# Patient Record
Sex: MALE | Race: WHITE | NOT HISPANIC OR LATINO | Employment: OTHER | URBAN - METROPOLITAN AREA
[De-identification: names, ages, dates, MRNs, and addresses within clinical notes are randomized per-mention and may not be internally consistent; named-entity substitution may affect disease eponyms.]

---

## 2019-02-21 ENCOUNTER — OFFICE VISIT (OUTPATIENT)
Dept: INTERNAL MEDICINE | Facility: CLINIC | Age: 39
End: 2019-02-21
Payer: COMMERCIAL

## 2019-02-21 VITALS
WEIGHT: 204.38 LBS | HEIGHT: 74 IN | HEART RATE: 82 BPM | DIASTOLIC BLOOD PRESSURE: 82 MMHG | BODY MASS INDEX: 26.23 KG/M2 | SYSTOLIC BLOOD PRESSURE: 122 MMHG

## 2019-02-21 DIAGNOSIS — Z76.89 ESTABLISHING CARE WITH NEW DOCTOR, ENCOUNTER FOR: Primary | ICD-10-CM

## 2019-02-21 DIAGNOSIS — M54.32 LEFT SIDED SCIATICA: ICD-10-CM

## 2019-02-21 DIAGNOSIS — N52.2 DRUG-INDUCED ERECTILE DYSFUNCTION: ICD-10-CM

## 2019-02-21 DIAGNOSIS — G43.009 MIGRAINE WITHOUT AURA AND WITHOUT STATUS MIGRAINOSUS, NOT INTRACTABLE: ICD-10-CM

## 2019-02-21 DIAGNOSIS — F41.1 GENERALIZED ANXIETY DISORDER: ICD-10-CM

## 2019-02-21 PROCEDURE — 99999 PR PBB SHADOW E&M-NEW PATIENT-LVL IV: ICD-10-PCS | Mod: PBBFAC,,, | Performed by: INTERNAL MEDICINE

## 2019-02-21 PROCEDURE — 99999 PR PBB SHADOW E&M-NEW PATIENT-LVL IV: CPT | Mod: PBBFAC,,, | Performed by: INTERNAL MEDICINE

## 2019-02-21 PROCEDURE — 99204 PR OFFICE/OUTPT VISIT, NEW, LEVL IV, 45-59 MIN: ICD-10-PCS | Mod: S$GLB,,, | Performed by: INTERNAL MEDICINE

## 2019-02-21 PROCEDURE — 99204 OFFICE O/P NEW MOD 45 MIN: CPT | Mod: S$GLB,,, | Performed by: INTERNAL MEDICINE

## 2019-02-21 RX ORDER — TADALAFIL 20 MG/1
20 TABLET ORAL DAILY
Qty: 10 TABLET | Refills: 11 | Status: SHIPPED | OUTPATIENT
Start: 2019-02-21

## 2019-02-21 RX ORDER — ORPHENADRINE CITRATE 100 MG/1
100 TABLET, EXTENDED RELEASE ORAL 2 TIMES DAILY PRN
Qty: 60 TABLET | Refills: 3 | Status: SHIPPED | OUTPATIENT
Start: 2019-02-21 | End: 2019-03-03

## 2019-02-21 RX ORDER — DICLOFENAC SODIUM 50 MG/1
50 TABLET, DELAYED RELEASE ORAL DAILY
COMMUNITY
End: 2019-02-21

## 2019-02-21 RX ORDER — BUPROPION HYDROCHLORIDE 150 MG/1
150 TABLET ORAL DAILY
Qty: 30 TABLET | Refills: 11 | Status: SHIPPED | OUTPATIENT
Start: 2019-02-21 | End: 2020-02-21

## 2019-02-21 RX ORDER — TOPIRAMATE 25 MG/1
25 TABLET ORAL DAILY
COMMUNITY
End: 2019-02-21

## 2019-02-21 RX ORDER — SUMATRIPTAN SUCCINATE 100 MG/1
100 TABLET ORAL EVERY 12 HOURS PRN
Qty: 20 TABLET | Refills: 11 | Status: SHIPPED | OUTPATIENT
Start: 2019-02-21

## 2019-02-21 RX ORDER — MELOXICAM 15 MG/1
15 TABLET ORAL DAILY
Qty: 30 TABLET | Refills: 0 | Status: SHIPPED | OUTPATIENT
Start: 2019-02-21

## 2019-02-21 NOTE — PROGRESS NOTES
INTERNAL MEDICINE CLINIC    Initial Visit to Establish Care    PRESENTING HISTORY     Previous PCP:  None.  Chief Complaint/Reason for Visit:     Chief Complaint   Patient presents with    Establish Care     History of Present Illness & ROS : Mr. Neftali Gamble is a 38 y.o. male.      Review of Systems   Constitutional: Negative for chills, fever and weight loss.   HENT: Positive for sinus pain.    Eyes: Negative for blurred vision.   Respiratory: Negative for cough and shortness of breath.    Cardiovascular: Negative for chest pain and leg swelling.   Gastrointestinal: Negative for abdominal pain, heartburn, nausea and vomiting.   Genitourinary: Negative for dysuria.   Musculoskeletal: Positive for back pain. Negative for neck pain.   Skin: Negative for rash.   Neurological: Negative for dizziness and headaches.   Endo/Heme/Allergies: Negative for polydipsia.   Psychiatric/Behavioral: Positive for depression. Negative for substance abuse and suicidal ideas. The patient is nervous/anxious.      Sexual dysfunction: erection only 10 minutes. It upsets him.  Anxious due to child with ADHD.    PAST HISTORY:     Past Medical History:   Diagnosis Date    History of cocaine use     Last use 2018.    Left sided sciatica 2/21/2019    Migraine without aura and without status migrainosus, not intractable 2/21/2019       History reviewed. No pertinent surgical history.    Family History   Problem Relation Age of Onset    Other Mother         Fell and had bleeding in brain    Diabetes Father     Colon cancer Father        Social History     Socioeconomic History    Marital status:      Spouse name: Prachi    Number of children: 1    Years of education: None    Highest education level: None   Social Needs    Financial resource strain: None    Food insecurity - worry: None    Food insecurity - inability: None    Transportation needs - medical: None    Transportation needs - non-medical: None   Occupational  History    Occupation: self employed   Tobacco Use    Smoking status: Former Smoker     Packs/day: 1.00     Years: 15.00     Pack years: 15.00    Smokeless tobacco: Never Used   Substance and Sexual Activity    Alcohol use: Yes     Alcohol/week: 8.4 oz     Types: 14 Glasses of wine per week    Drug use: No     Comment: Was on cocaine in the past    Sexual activity: Yes     Partners: Female   Other Topics Concern    None   Social History Narrative    Self-Employee:  [employer]      but with problem - trying to reconcile.    Wife: Prachi Gamble    1 child (5 year Carlos - ADHD as of 2019)       MEDICATIONS & ALLERGIES:     Current Outpatient Medications on File Prior to Visit   Medication Sig Dispense Refill    diclofenac (VOLTAREN) 50 MG EC tablet Take 50 mg by mouth once daily.      topiramate (TOPAMAX) 25 MG tablet Take 25 mg by mouth once daily.       Review of patient's allergies indicates:   Allergen Reactions    Nsaids (non-steroidal anti-inflammatory drug)      Pt states he does not know the name of the NSAID that caused him to break out into a rash.  PT states he has no problems taking OTC NSAIDS       Medications Reconciliation:   I have reconciled the patient's home medications with the patient/family. I have updated all changes.  Refer to After-Visit Medication List.    OBJECTIVE:     Vital Signs:  Vitals:    02/21/19 1729   BP: 122/82   Pulse: 82     Wt Readings from Last 1 Encounters:   02/21/19 1729 92.7 kg (204 lb 5.9 oz)     Body mass index is 26.24 kg/m².     Physical Exam:  General: Well developed, well nourished. No distress.  HEENT: Head is normocephalic, atraumatic; ears are normal.    Eyes: Clear conjunctiva.  Neck: Supple, symmetrical neck; trachea midline.  Lungs: Clear to auscultation bilaterally and normal respiratory effort.  Cardiovascular: Heart with regular rate and rhythm.    Extremities: No LE edema.    Abdomen: Abdomen is soft, non-tender non-distended with  normal bowel sounds.  Musculoskeletal: Normal gait.   Genital:  Normal penis. . No rash.  Scrotum and epididymis normal. No inguinal hernia.  No inguinal nodes. No rash found.  Rectal: Deferred.  Lymph Nodes: No cervical, supraclavicular or axillary adenopathy.  Psychiatric: Normal affect. Alert.      ASSESSMENT & PLAN:   New patient who has not seen Primary Care Provider at [facility] for the past 3 years.  Patient is new to me. Medical, surgical, social, medication and allergy histories were obtained during this visit.    Establish Care with New doctor  - Reviewed and updated past and current medical problems.  Discussed treatment of current medical problems    Generalized anxiety disorder  - Will try:  -     buPROPion (WELLBUTRIN XL) 150 MG TB24 tablet; Take 1 tablet (150 mg total) by mouth once daily.  Dispense: 30 tablet; Refill: 11    Left sided sciatica  -     Ambulatory referral to Pain Clinic  -     orphenadrine (NORFLEX) 100 mg tablet; Take 1 tablet (100 mg total) by mouth 2 (two) times daily as needed for Muscle spasms.  Dispense: 60 tablet; Refill: 3  -     meloxicam (MOBIC) 15 MG tablet; Take 1 tablet (15 mg total) by mouth once daily. Take with food.  Dispense: 30 tablet; Refill: 0    Migraine without aura and without status migrainosus, not intractable  -     sumatriptan (IMITREX) 100 MG tablet; Take 1 tablet (100 mg total) by mouth every 12 (twelve) hours as needed for Migraine. No more than twice in a 24 hour period  Dispense: 20 tablet; Refill: 11    Drug-induced erectile dysfunction  -     tadalafil (CIALIS) 20 MG Tab; Take 1 tablet (20 mg total) by mouth once daily.  Dispense: 10 tablet; Refill: 11    Preventive Health Maintenance:  Tdap, Flu Rx given to the patient.    Return to Clinic for Follow Up with me:  1 year.    Scheduled Follow-up :  Future Appointments   Date Time Provider Department Center   3/6/2019  9:00 AM Mika Fortune MD [facility]       After Visit Medication List  :     Medication List           Accurate as of 2/21/19  6:13 PM. If you have any questions, ask your nurse or doctor.               START taking these medications    buPROPion 150 MG TB24 tablet  Commonly known as:  WELLBUTRIN XL  Take 1 tablet (150 mg total) by mouth once daily.  Started by:  Fernando Snowden MD     meloxicam 15 MG tablet  Commonly known as:  MOBIC  Take 1 tablet (15 mg total) by mouth once daily. Take with food.  Started by:  Fernando Snowden MD     orphenadrine 100 mg tablet  Commonly known as:  NORFLEX  Take 1 tablet (100 mg total) by mouth 2 (two) times daily as needed for Muscle spasms.  Started by:  Fernando Snowden MD     sumatriptan 100 MG tablet  Commonly known as:  IMITREX  Take 1 tablet (100 mg total) by mouth every 12 (twelve) hours as needed for Migraine. No more than twice in a 24 hour period  Started by:  Fernando Snowden MD     tadalafil 20 MG Tab  Commonly known as:  CIALIS  Take 1 tablet (20 mg total) by mouth once daily.  Started by:  Fernando Snowden MD        STOP taking these medications    diazePAM 5 MG tablet  Commonly known as:  VALIUM  Stopped by:  Fernando Snowden MD     diclofenac 50 MG EC tablet  Commonly known as:  VOLTAREN  Stopped by:  Fernando Snowden MD     topiramate 25 MG tablet  Commonly known as:  TOPAMAX  Stopped by:  Fernando Snowden MD           Where to Get Your Medications      These medications were sent to [facility] [id number] [place] [address] [address]  AT [facility] [address]  [address] [place] [address]    Hours:  24-hours Phone:  [phone]   · buPROPion 150 MG TB24 tablet  · meloxicam 15 MG tablet  · orphenadrine 100 mg tablet  · sumatriptan 100 MG tablet  · tadalafil 20 MG Tab         Signing Physician:  Fernando Snowden MD

## 2019-02-25 ENCOUNTER — PATIENT MESSAGE (OUTPATIENT)
Dept: INTERNAL MEDICINE | Facility: CLINIC | Age: 39
End: 2019-02-25

## 2019-03-18 ENCOUNTER — OFFICE VISIT (OUTPATIENT)
Dept: PAIN MEDICINE | Facility: CLINIC | Age: 39
End: 2019-03-18
Payer: COMMERCIAL

## 2019-03-18 ENCOUNTER — PATIENT MESSAGE (OUTPATIENT)
Dept: PAIN MEDICINE | Facility: CLINIC | Age: 39
End: 2019-03-18

## 2019-03-18 VITALS
HEIGHT: 74 IN | BODY MASS INDEX: 25.75 KG/M2 | RESPIRATION RATE: 18 BRPM | WEIGHT: 200.63 LBS | TEMPERATURE: 98 F | HEART RATE: 62 BPM | SYSTOLIC BLOOD PRESSURE: 126 MMHG | DIASTOLIC BLOOD PRESSURE: 82 MMHG

## 2019-03-18 DIAGNOSIS — M54.42 CHRONIC LOW BACK PAIN WITH LEFT-SIDED SCIATICA, UNSPECIFIED BACK PAIN LATERALITY: ICD-10-CM

## 2019-03-18 DIAGNOSIS — M47.816 LUMBAR SPONDYLOSIS: ICD-10-CM

## 2019-03-18 DIAGNOSIS — M54.16 LUMBAR RADICULOPATHY: Primary | ICD-10-CM

## 2019-03-18 DIAGNOSIS — G89.29 CHRONIC LOW BACK PAIN WITH LEFT-SIDED SCIATICA, UNSPECIFIED BACK PAIN LATERALITY: ICD-10-CM

## 2019-03-18 PROCEDURE — 99204 OFFICE O/P NEW MOD 45 MIN: CPT | Mod: S$GLB,,, | Performed by: ANESTHESIOLOGY

## 2019-03-18 PROCEDURE — 99999 PR PBB SHADOW E&M-EST. PATIENT-LVL V: CPT | Mod: PBBFAC,,, | Performed by: ANESTHESIOLOGY

## 2019-03-18 PROCEDURE — 99204 PR OFFICE/OUTPT VISIT, NEW, LEVL IV, 45-59 MIN: ICD-10-PCS | Mod: S$GLB,,, | Performed by: ANESTHESIOLOGY

## 2019-03-18 PROCEDURE — 99999 PR PBB SHADOW E&M-EST. PATIENT-LVL V: ICD-10-PCS | Mod: PBBFAC,,, | Performed by: ANESTHESIOLOGY

## 2019-03-18 RX ORDER — GABAPENTIN 300 MG/1
300 CAPSULE ORAL 3 TIMES DAILY
Qty: 90 CAPSULE | Refills: 3 | Status: SHIPPED | OUTPATIENT
Start: 2019-03-18 | End: 2019-03-18 | Stop reason: SDUPTHER

## 2019-03-18 RX ORDER — GABAPENTIN 300 MG/1
300 CAPSULE ORAL 3 TIMES DAILY
Qty: 90 CAPSULE | Refills: 3 | Status: SHIPPED | OUTPATIENT
Start: 2019-03-18 | End: 2020-03-17

## 2019-03-18 NOTE — PROGRESS NOTES
Chronic Pain - New Consult    Referring Physician: Fernando Snowden MD    Chief Complaint:   Chief Complaint   Patient presents with    Low-back Pain     raidates to left leg pain         SUBJECTIVE:    Neftali Gamble presents to the clinic for the evaluation of lower back pain with left lower extremity radiation and tingling  The pain started 7 years ago following an injury and symptoms have been worsening.The pain is located in the lumbar area and radiates to the LLE.  The pain is described as sharp and is rated as 4/10. The pain is rated with a score of  5/10 on the BEST day and a score of 10/10 on the WORST day.  Symptoms interfere with daily activity and work. The pain is exacerbated by Standing and Walking.  The pain is mitigated by massage, medications and rest. He reports spending 0 hours per day reclining. The patient reports 7-8 hours of uninterrupted sleep per night.    Mr. Gamble presents with lumbar pain for roughly 7 years with 2 years of LLE radiation to the lateral and medial aspects of the thigh and leg to the bottom of the foot. He was evaluated and treated 7 years ago after a work related injury at [facility] where he underwent several lumbar injections providing minimal relief. He reports was also evaluated by a neurosurgeon at the time, the patient cannot recall the surgeon's name and mentioned that the patient may need surgery in the future. He reports was given a waist belt to wear for the lumbar pain which caused the onset for the LLE radicular symptoms and since then has not subsided. He takes NSAID's and muscle relaxer's , uses  TENS unit, and massage     Patient denies urinary incontinence, bowel incontinence and significant motor weakness.    Physical Therapy/Home Exercise: yes 2013 - temporarily , returned after completion of PT,  Continues to do exercise     Pain Disability Index Review:  Last 3 PDI Scores 3/18/2019   Pain Disability Index (PDI) 2       Pain  Medications:  Norfelx, mobic     - Adjuvant Medications: Mobic (Meloxicam)     report:  Not applicable    Pain Procedures: several lumbar injections at [facility]  Imaging:  X-Ray Lumbar Spine Ap And Lateral   Order: [id number]   Status:  Final result   Visible to patient:  Yes (Patient Portal) Next appt:  None   Details     Reading Physician Reading Date Result Priority   Imtiaz Best MD 3/12/2013       Narrative     No significant alignment abnormality.  Vertebral body heights are normally maintained, without compression deformity at any level.  No significant disc narrowing.  Vertebral endplates are well defined.  No osseous destructive process.  SI joints appear   unremarkable.      Impression      No significant abnormality.      Electronically signed by: Imtiaz Best MD  Date: 03/12/13  Time: 11:15                Past Medical History:   Diagnosis Date    Drug-induced erectile dysfunction 2/21/2019    Generalized anxiety disorder 2/21/2019    History of cocaine use     Last use 2018.    Left sided sciatica 2/21/2019    Migraine without aura and without status migrainosus, not intractable 2/21/2019     No past surgical history on file.  Social History     Socioeconomic History    Marital status:      Spouse name: Prachi    Number of children: 1    Years of education: Not on file    Highest education level: Not on file   Social Needs    Financial resource strain: Not on file    Food insecurity - worry: Not on file    Food insecurity - inability: Not on file    Transportation needs - medical: Not on file    Transportation needs - non-medical: Not on file   Occupational History    Occupation: self employed   Tobacco Use    Smoking status: Former Smoker     Packs/day: 1.00     Years: 15.00     Pack years: 15.00    Smokeless tobacco: Never Used   Substance and Sexual Activity    Alcohol use: Yes     Alcohol/week: 8.4 oz     Types: 14 Glasses of wine per week    Drug use: No      Comment: Was on cocaine in the past    Sexual activity: Yes     Partners: Female   Other Topics Concern    Not on file   Social History Narrative    Self-Employee:  [employer]      but with problem - trying to reconcile.    Wife: Prachi Gamble    1 child (5 year Carlos - ADHD as of 2019)     Family History   Problem Relation Age of Onset    Other Mother         Fell and had bleeding in brain    Diabetes Father     Colon cancer Father        Review of patient's allergies indicates:  No Known Allergies    Current Outpatient Medications   Medication Sig    buPROPion (WELLBUTRIN XL) 150 MG TB24 tablet Take 1 tablet (150 mg total) by mouth once daily.    gabapentin (NEURONTIN) 300 MG capsule Take 1 capsule (300 mg total) by mouth 3 (three) times daily. Take 1 pill at nightx 7 days, followed by 1 pill in morning and night for 7 days, followed by 1 pill morning afternoon and night    meloxicam (MOBIC) 15 MG tablet Take 1 tablet (15 mg total) by mouth once daily. Take with food.    sumatriptan (IMITREX) 100 MG tablet Take 1 tablet (100 mg total) by mouth every 12 (twelve) hours as needed for Migraine. No more than twice in a 24 hour period    tadalafil (CIALIS) 20 MG Tab Take 1 tablet (20 mg total) by mouth once daily.     No current facility-administered medications for this visit.        REVIEW OF SYSTEMS:    GENERAL:  No weight loss, malaise or fevers.  HEENT:  Negative for frequent or significant headaches.  NECK:  Negative for lumps, goiter, pain and significant neck swelling.  RESPIRATORY:  Negative for cough, wheezing or shortness of breath.  CARDIOVASCULAR:  Negative for chest pain, leg swelling or palpitations.  GI:  Negative for abdominal discomfort, blood in stools or black stools or change in bowel habits.  MUSCULOSKELETAL:  See HPI.  SKIN:  Negative for lesions, rash, and itching.  PSYCH:  Negative for sleep disturbance, mood disorder and recent psychosocial stressors. +  "PATRICK  HEMATOLOGY/LYMPHOLOGY:  Negative for prolonged bleeding, bruising easily or swollen nodes.  NEURO:   No history of headaches, syncope, paralysis, seizures or tremors.  All other reviewed and negative other than HPI.    OBJECTIVE:    /82   Pulse 62   Temp 98.4 °F (36.9 °C) (Oral)   Resp 18   Ht 6' 2" (1.88 m)   Wt 91 kg (200 lb 9.9 oz)   BMI 25.76 kg/m²     PHYSICAL EXAMINATION:    General appearance: Well appearing, in no acute distress, alert and oriented x3.  Psych:  Mood and affect appropriate.  Skin: Skin color, texture, turgor normal, no rashes or lesions, in both upper and lower body.  Head/face:  Normocephalic, atraumatic. No palpable lymph nodes.  Cor: RRR  Pulm: CTA  GI:  Soft and non-tender.  Back: Straight leg raising in the sitting and supine positions is negative to radicular pain. Limited range of motion of lumbar spine.  Tenderness to palpation over the left lumbar paraspinal muscles with positive facet loading on the left.  Extremities: Peripheral joint ROM is full and pain free without obvious instability or laxity in all four extremities. No deformities, edema, or skin discoloration. Good capillary refill.  Musculoskeletal: hip, sacroiliac and knee provocative maneuvers are negative. Bilateral  lower extremity strength is normal and symmetric.  No atrophy or tone abnormalities are noted.  Neuro: Bilateral  lower extremity coordination and muscle stretch reflexes are physiologic and symmetric.  Plantar response are downgoing. No loss of sensation is noted.  Gait: normal.    ASSESSMENT: 38 y.o. year old male with history of PATRICK pain, consistent with diagnosis below     1. Lumbar radiculopathy  X-Ray Lumbar Complete With Flex And Ext    MRI Lumbar Spine Without Contrast    Ambulatory consult to Physical Therapy    MRI Lumbar Spine Without Contrast    gabapentin (NEURONTIN) 300 MG capsule    DISCONTINUED: gabapentin (NEURONTIN) 300 MG capsule   2. Chronic low back pain with left-sided " sciatica, unspecified back pain laterality  X-Ray Lumbar Complete With Flex And Ext    MRI Lumbar Spine Without Contrast    Ambulatory consult to Physical Therapy    MRI Lumbar Spine Without Contrast    gabapentin (NEURONTIN) 300 MG capsule    DISCONTINUED: gabapentin (NEURONTIN) 300 MG capsule   3. Lumbar spondylosis  X-Ray Lumbar Complete With Flex And Ext    MRI Lumbar Spine Without Contrast    Ambulatory consult to Physical Therapy    MRI Lumbar Spine Without Contrast    gabapentin (NEURONTIN) 300 MG capsule    DISCONTINUED: gabapentin (NEURONTIN) 300 MG capsule         PLAN:     Previous imaging was reviewed and discussed with the patient today.     -Referral for PT for lumbar conditioning, exercises and strengthening for lumbar axial and LLE radicular pian      -Lumbar MRI and flex/ext xrays, he reports his last MRI was around 2011 following the work related injury and lumbar Xrays were in 2013     -Prescription for gabapentin 300mg TID escalating  the dose per week for 3 weeks to achieve 3 capsules three times a day, monitoring for possible side effects     - I have stressed the importance of physical activity and a home exercise plan to help with pain and improve health.    - Patient can continue with medications for now since they are providing benefits, using them appropriately, and without side effects.    - RTC 4 weeks, will follow up MRI/ xrays and progression of PT, we will also consider intervention after reviewing imaging     - Counseled patient regarding the importance of activity modification, constant sleeping habits and physical therapy.    He reports axial and facet loading pain on physical exam  with LLE radiculopathy and from his history, after reviewing his lumbar imaging, we may consider TFESI at the levels of neuralformainal narrowing and if there is facet arthropathy presents,we may consider lumbar MBB/RFA in the future. He will need to continue his PT home plan for core strengthening and  exercise to help with his lower back and radicular symptoms.     The above plan and management options were discussed at length with patient. Patient is in agreement with the above and verbalized understanding. It will be communicated with the referring physician via electronic record, fax, or mail.      I have reviewed and concur with the resident's history, physical, assessment, and plan.  I have personally interviewed and examined the patient at bedside.  See below addendum for my evaluation and additional findings.  38-year-old male with chronic lower back pain consistent with lumbar radiculopathy with radiation to left lower extremity.  On exam there is tenderness to palpation over the left lumbar paraspinal muscles positive facet loading on the left side.  We will refer him to physical therapy, order lumbar spine x-ray and MRI to further evaluate the etiology of his pain. We will start him on gabapentin 300 mg t.i.d.    Mika Fortune  03/18/2019

## 2019-03-18 NOTE — LETTER
March 18, 2019      Fernando Snowden MD  [address]  [facility] [address]           [facility] [address]  [address]  [place] [address]  Phone: [phone]  Fax: [phone]          Patient: Neftali Gamble   MR Number: [id number]   YOB: 1980   Date of Visit: 3/18/2019       Dear Dr. Fernando Snowden:    Thank you for referring Neftali Gamble to me for evaluation. Attached you will find relevant portions of my assessment and plan of care.    If you have questions, please do not hesitate to call me. I look forward to following Neftali Gamble along with you.    Sincerely,    Mika Fortune MD    Enclosure  CC:  No Recipients    If you would like to receive this communication electronically, please contact [email or web address] or [phone] to request more information on [employer] access.    For providers and/or their staff who would like to refer a patient to [facility], please contact us through our one-stop-shop provider referral line, [facility], at [phone].    If you feel you have received this communication in error or would no longer like to receive these types of communications, please e-mail [email or web address]

## 2019-03-19 ENCOUNTER — HOSPITAL ENCOUNTER (OUTPATIENT)
Dept: RADIOLOGY | Facility: OTHER | Age: 39
Discharge: HOME | End: 2019-03-19
Attending: STUDENT IN AN ORGANIZED HEALTH CARE EDUCATION/TRAINING PROGRAM
Payer: COMMERCIAL

## 2019-03-19 DIAGNOSIS — G89.29 CHRONIC LOW BACK PAIN WITH LEFT-SIDED SCIATICA, UNSPECIFIED BACK PAIN LATERALITY: ICD-10-CM

## 2019-03-19 DIAGNOSIS — M54.42 CHRONIC LOW BACK PAIN WITH LEFT-SIDED SCIATICA, UNSPECIFIED BACK PAIN LATERALITY: ICD-10-CM

## 2019-03-19 DIAGNOSIS — M47.816 LUMBAR SPONDYLOSIS: ICD-10-CM

## 2019-03-19 DIAGNOSIS — M54.16 LUMBAR RADICULOPATHY: ICD-10-CM

## 2019-03-19 PROCEDURE — 72110 X-RAY EXAM L-2 SPINE 4/>VWS: CPT | Mod: TC,FY

## 2019-03-19 PROCEDURE — 72110 XR LUMBAR SPINE 5 VIEW WITH FLEX AND EXT: ICD-10-PCS | Mod: 26,,, | Performed by: RADIOLOGY

## 2019-03-19 PROCEDURE — 72110 X-RAY EXAM L-2 SPINE 4/>VWS: CPT | Mod: 26,,, | Performed by: RADIOLOGY

## 2019-03-19 RX ORDER — GABAPENTIN 300 MG/1
300 CAPSULE ORAL 3 TIMES DAILY
Qty: 90 CAPSULE | Refills: 2 | Status: SHIPPED | OUTPATIENT
Start: 2019-03-19 | End: 2019-04-18

## 2019-12-15 ENCOUNTER — HOSPITAL ENCOUNTER (EMERGENCY)
Facility: HOSPITAL | Age: 39
Discharge: LEFT BEFORE DISCHARGE | End: 2019-12-15
Attending: EMERGENCY MEDICINE
Payer: COMMERCIAL

## 2019-12-15 VITALS
TEMPERATURE: 99 F | SYSTOLIC BLOOD PRESSURE: 136 MMHG | BODY MASS INDEX: 25.67 KG/M2 | RESPIRATION RATE: 20 BRPM | DIASTOLIC BLOOD PRESSURE: 76 MMHG | HEIGHT: 74 IN | WEIGHT: 200 LBS | HEART RATE: 92 BPM | OXYGEN SATURATION: 98 %

## 2019-12-15 DIAGNOSIS — K92.2 GI BLEED: ICD-10-CM

## 2019-12-15 LAB
ABO + RH BLD: NORMAL
ALBUMIN SERPL BCP-MCNC: 4.7 G/DL (ref 3.5–5.2)
ALP SERPL-CCNC: 92 U/L (ref 55–135)
ALT SERPL W/O P-5'-P-CCNC: 39 U/L (ref 10–44)
ANION GAP SERPL CALC-SCNC: 12 MMOL/L (ref 8–16)
AST SERPL-CCNC: 22 U/L (ref 10–40)
BASOPHILS # BLD AUTO: 0.04 K/UL (ref 0–0.2)
BASOPHILS NFR BLD: 0.3 % (ref 0–1.9)
BILIRUB SERPL-MCNC: 1.3 MG/DL (ref 0.1–1)
BLD GP AB SCN CELLS X3 SERPL QL: NORMAL
BUN SERPL-MCNC: 15 MG/DL (ref 6–20)
CALCIUM SERPL-MCNC: 10.4 MG/DL (ref 8.7–10.5)
CHLORIDE SERPL-SCNC: 100 MMOL/L (ref 95–110)
CO2 SERPL-SCNC: 26 MMOL/L (ref 23–29)
CREAT SERPL-MCNC: 1 MG/DL (ref 0.5–1.4)
DIFFERENTIAL METHOD: ABNORMAL
EOSINOPHIL # BLD AUTO: 0 K/UL (ref 0–0.5)
EOSINOPHIL NFR BLD: 0.1 % (ref 0–8)
ERYTHROCYTE [DISTWIDTH] IN BLOOD BY AUTOMATED COUNT: 11.5 % (ref 11.5–14.5)
EST. GFR  (AFRICAN AMERICAN): >60 ML/MIN/1.73 M^2
EST. GFR  (NON AFRICAN AMERICAN): >60 ML/MIN/1.73 M^2
GLUCOSE SERPL-MCNC: 129 MG/DL (ref 70–110)
HCT VFR BLD AUTO: 47.8 % (ref 40–54)
HGB BLD-MCNC: 17.1 G/DL (ref 14–18)
IMM GRANULOCYTES # BLD AUTO: 0.05 K/UL (ref 0–0.04)
IMM GRANULOCYTES NFR BLD AUTO: 0.3 % (ref 0–0.5)
INR PPP: 1 (ref 0.8–1.2)
LIPASE SERPL-CCNC: 15 U/L (ref 4–60)
LYMPHOCYTES # BLD AUTO: 2 K/UL (ref 1–4.8)
LYMPHOCYTES NFR BLD: 13.6 % (ref 18–48)
MCH RBC QN AUTO: 31.2 PG (ref 27–31)
MCHC RBC AUTO-ENTMCNC: 35.8 G/DL (ref 32–36)
MCV RBC AUTO: 87 FL (ref 82–98)
MONOCYTES # BLD AUTO: 1.2 K/UL (ref 0.3–1)
MONOCYTES NFR BLD: 7.9 % (ref 4–15)
NEUTROPHILS # BLD AUTO: 11.3 K/UL (ref 1.8–7.7)
NEUTROPHILS NFR BLD: 77.8 % (ref 38–73)
NRBC BLD-RTO: 0 /100 WBC
PLATELET # BLD AUTO: 278 K/UL (ref 150–350)
PMV BLD AUTO: 9.5 FL (ref 9.2–12.9)
POTASSIUM SERPL-SCNC: 3.5 MMOL/L (ref 3.5–5.1)
PROT SERPL-MCNC: 8.6 G/DL (ref 6–8.4)
PROTHROMBIN TIME: 10.3 SEC (ref 9–12.5)
RBC # BLD AUTO: 5.48 M/UL (ref 4.6–6.2)
SODIUM SERPL-SCNC: 138 MMOL/L (ref 136–145)
WBC # BLD AUTO: 14.53 K/UL (ref 3.9–12.7)

## 2019-12-15 PROCEDURE — 96374 THER/PROPH/DIAG INJ IV PUSH: CPT

## 2019-12-15 PROCEDURE — 99285 PR EMERGENCY DEPT VISIT,LEVEL V: ICD-10-PCS | Mod: ,,, | Performed by: EMERGENCY MEDICINE

## 2019-12-15 PROCEDURE — 85610 PROTHROMBIN TIME: CPT

## 2019-12-15 PROCEDURE — 93010 ELECTROCARDIOGRAM REPORT: CPT | Mod: ,,, | Performed by: INTERNAL MEDICINE

## 2019-12-15 PROCEDURE — 83690 ASSAY OF LIPASE: CPT

## 2019-12-15 PROCEDURE — 93005 ELECTROCARDIOGRAM TRACING: CPT

## 2019-12-15 PROCEDURE — 99284 EMERGENCY DEPT VISIT MOD MDM: CPT | Mod: 25

## 2019-12-15 PROCEDURE — 93010 EKG 12-LEAD: ICD-10-PCS | Mod: ,,, | Performed by: INTERNAL MEDICINE

## 2019-12-15 PROCEDURE — 63600175 PHARM REV CODE 636 W HCPCS: Performed by: EMERGENCY MEDICINE

## 2019-12-15 PROCEDURE — 80053 COMPREHEN METABOLIC PANEL: CPT

## 2019-12-15 PROCEDURE — 96375 TX/PRO/DX INJ NEW DRUG ADDON: CPT

## 2019-12-15 PROCEDURE — 85025 COMPLETE CBC W/AUTO DIFF WBC: CPT

## 2019-12-15 PROCEDURE — 99285 EMERGENCY DEPT VISIT HI MDM: CPT | Mod: ,,, | Performed by: EMERGENCY MEDICINE

## 2019-12-15 PROCEDURE — C9113 INJ PANTOPRAZOLE SODIUM, VIA: HCPCS | Performed by: EMERGENCY MEDICINE

## 2019-12-15 PROCEDURE — 86901 BLOOD TYPING SEROLOGIC RH(D): CPT

## 2019-12-15 RX ORDER — PANTOPRAZOLE SODIUM 40 MG/10ML
80 INJECTION, POWDER, LYOPHILIZED, FOR SOLUTION INTRAVENOUS
Status: COMPLETED | OUTPATIENT
Start: 2019-12-15 | End: 2019-12-15

## 2019-12-15 RX ORDER — PANTOPRAZOLE SODIUM 20 MG/1
20 TABLET, DELAYED RELEASE ORAL DAILY
Qty: 30 TABLET | Refills: 0 | Status: SHIPPED | OUTPATIENT
Start: 2019-12-15 | End: 2020-12-14

## 2019-12-15 RX ORDER — ONDANSETRON 2 MG/ML
4 INJECTION INTRAMUSCULAR; INTRAVENOUS
Status: COMPLETED | OUTPATIENT
Start: 2019-12-15 | End: 2019-12-15

## 2019-12-15 RX ORDER — ONDANSETRON 8 MG/1
8 TABLET, ORALLY DISINTEGRATING ORAL EVERY 8 HOURS PRN
Qty: 6 TABLET | Refills: 0 | Status: SHIPPED | OUTPATIENT
Start: 2019-12-15

## 2019-12-15 RX ADMIN — SODIUM CHLORIDE 1000 ML: 0.9 INJECTION, SOLUTION INTRAVENOUS at 08:12

## 2019-12-15 RX ADMIN — ONDANSETRON 4 MG: 2 INJECTION INTRAMUSCULAR; INTRAVENOUS at 08:12

## 2019-12-15 RX ADMIN — PANTOPRAZOLE SODIUM 80 MG: 40 INJECTION, POWDER, FOR SOLUTION INTRAVENOUS at 08:12

## 2019-12-16 NOTE — ED PROVIDER NOTES
Source of History:  Patient    Chief complaint:  Hematemesis (Pt c/o dark red blood in emesis since yesterday. He reports multiple episodes since then. Denies blood in stool. )      HPI:  Neftali Gamble is a 39 y.o. male presenting with multiple episodes of vomiting since yesterday that finish with some blood.  The vomit is dark brown initially and then ends with blood.  He has a need anything last couple of days.  Friday night he when out and had a fair amount to drink; his wife is with him and does not have any similar symptoms.  He had 1 stool yesterday that looked normal. No history of gastritis, reflux, epigastric pain, bleeding disorders or any other abdominal problems.    ROS: As per HPI and below:  General: No fever.  No chills.  Eyes: No visual changes.  Head: No headache.    Integument: No rashes or lesions.  Chest: No shortness of breath.  Cardiovascular: No chest pain.  Abdomen: No abdominal pain.  Notes nausea and vomiting.  Urinary: No abnormal urination.  Neurologic: No focal weakness.  No numbness.  Hematologic: No easy bruising.  Endocrine: No excessive thirst or urination.      Review of patient's allergies indicates:  No Known Allergies    No current facility-administered medications on file prior to encounter.      Current Outpatient Medications on File Prior to Encounter   Medication Sig Dispense Refill    buPROPion (WELLBUTRIN XL) 150 MG TB24 tablet Take 1 tablet (150 mg total) by mouth once daily. 30 tablet 11    gabapentin (NEURONTIN) 300 MG capsule Take 1 capsule (300 mg total) by mouth 3 (three) times daily. Take 1 pill at nightx 7 days, followed by 1 pill in morning and night for 7 days, followed by 1 pill morning afternoon and night 90 capsule 3    meloxicam (MOBIC) 15 MG tablet Take 1 tablet (15 mg total) by mouth once daily. Take with food. 30 tablet 0    sumatriptan (IMITREX) 100 MG tablet Take 1 tablet (100 mg total) by mouth every 12 (twelve) hours as needed for Migraine. No more  than twice in a 24 hour period 20 tablet 11    tadalafil (CIALIS) 20 MG Tab Take 1 tablet (20 mg total) by mouth once daily. 10 tablet 11       PMH:  As per HPI and below:  Past Medical History:   Diagnosis Date    Drug-induced erectile dysfunction 2/21/2019    Generalized anxiety disorder 2/21/2019    History of cocaine use     Last use 2018.    Left sided sciatica 2/21/2019    Migraine without aura and without status migrainosus, not intractable 2/21/2019     History reviewed. No pertinent surgical history.    Social History     Socioeconomic History    Marital status:      Spouse name: Prachi    Number of children: 1    Years of education: Not on file    Highest education level: Not on file   Occupational History    Occupation: self employed   Social Needs    Financial resource strain: Not on file    Food insecurity:     Worry: Not on file     Inability: Not on file    Transportation needs:     Medical: Not on file     Non-medical: Not on file   Tobacco Use    Smoking status: Former Smoker     Packs/day: 1.00     Years: 15.00     Pack years: 15.00    Smokeless tobacco: Never Used   Substance and Sexual Activity    Alcohol use: Yes     Alcohol/week: 14.0 standard drinks     Types: 14 Glasses of wine per week    Drug use: No     Comment: Was on cocaine in the past    Sexual activity: Yes     Partners: Female   Lifestyle    Physical activity:     Days per week: Not on file     Minutes per session: Not on file    Stress: Not on file   Relationships    Social connections:     Talks on phone: Not on file     Gets together: Not on file     Attends Rastafarian service: Not on file     Active member of club or organization: Not on file     Attends meetings of clubs or organizations: Not on file     Relationship status: Not on file   Other Topics Concern    Not on file   Social History Narrative    Self-Employee:  [employer]      but with problem - trying to reconcile.    Wife:  "Prachi Gamble    1 child (5 year Carlos - ADHD as of 2019)       Family History   Problem Relation Age of Onset    Other Mother         Fell and had bleeding in brain    Diabetes Father     Colon cancer Father        Physical Exam:    Vitals:    12/15/19 1906 12/15/19 1949 12/15/19 1950 12/15/19 2100   BP: (!) 143/81 (!) 151/85 (!) 142/80 136/76   Pulse: 105 110 104 92   Resp: 16  20 20   Temp: 99.2 °F (37.3 °C)      TempSrc: Oral      SpO2: 97% 97% 98% 98%   Weight: 90.7 kg (200 lb)      Height: 6' 2" (1.88 m)        Appearance: No acute distress.  Skin: No rashes seen.  Good turgor.  No abrasions.  No ecchymoses.  Eyes: No conjunctival injection.  ENT: Oropharynx clear.    Chest: Clear to auscultation bilaterally.  Good air movement.  No wheezes.  No rhonchi.  Cardiovascular: Regular rate and rhythm.  No murmurs. No gallops. No rubs.  Abdomen: Soft.  Not distended.  Nontender.  No guarding.  No rebound.  Musculoskeletal: Good range of motion all joints.  No deformities.  Neck supple.  No meningismus.  Neurologic: Motor intact.  Sensation intact.  Cerebellar intact.  Cranial nerves intact.  Mental Status:  Alert and oriented x 3.  Appropriate, conversant.      Initial Impression:  Hematemesis.  When he describes sounds like coffee-ground emesis followed by blood.  There are any significant amounts of clots.  He also just could have some mild gastritis following heavy night of drinking Friday night.  Going to give him fluids and nausea medicine and check his blood counts.  Plan to discuss with GI.    Laboratory Studies:  Labs Reviewed   CBC W/ AUTO DIFFERENTIAL - Abnormal; Notable for the following components:       Result Value    WBC 14.53 (*)     Mean Corpuscular Hemoglobin 31.2 (*)     Gran # (ANC) 11.3 (*)     Immature Grans (Abs) 0.05 (*)     Mono # 1.2 (*)     Gran% 77.8 (*)     Lymph% 13.6 (*)     All other components within normal limits   COMPREHENSIVE METABOLIC PANEL - Abnormal; Notable for the following " components:    Glucose 129 (*)     Total Protein 8.6 (*)     Total Bilirubin 1.3 (*)     All other components within normal limits   PROTIME-INR   LIPASE   TYPE & SCREEN       EKG (independently interpreted by me): EKG shows normal sinus rhythm and no acute ischemia, rate of 80.      Chart reviewed.     Imaging Results    None         Medications Given:  Medications   sodium chloride 0.9% bolus 1,000 mL (1,000 mLs Intravenous New Bag 12/15/19 2010)   pantoprazole injection 80 mg (80 mg Intravenous Given 12/15/19 2015)   ondansetron injection 4 mg (4 mg Intravenous Given 12/15/19 2010)       Discussed with:  Gastroenterology.  Given his benign labs, they want us to recheck his CBC in a few hours and discharge if no significant drop.    ED Course:  ED Course as of Dec 16 1515   Sun Dec 15, 2019   2035 Lipase: 15 [DC]   2036 Hemoglobin: 17.1 [DC]   2037 Creatinine: 1.0 [DC]   2037 Coumadin Monitoring INR: 1.0 [DC]   2037 Platelets: 278 [DC]      ED Course User Index  [DC] Fernando Deleon MD           MDM:    39 y.o. male with vomiting with a little bit of blood, normal labs, stable vitals.  Unlikely to be serious upper GI bleed.  Will urgent referral to GI.  Will check second CBC after observing for a few hours.  Turned over to Dr. Martin pending results.      Diagnostic Impression:    1. GI bleed               Fernando Deleon MD  12/16/19 1516

## 2019-12-16 NOTE — ED NOTES
Pt reports emesis that began yesterday that contains bright red blood, HA, and substernal chest pain. Pt reports at least 10 episodes of emesis. Pt denies SOB, dizziness, changes in vision, changes in urination/BM, abdominal pain. Pt denies taking medication today.

## 2019-12-16 NOTE — ED NOTES
Pt ststed he wanted to go home AMA. Pt stated he had is son with him and wanted to get him back home and  to school. Pt stated he was feeling better and was able to drink liquids. Joy Martin MD notified and AMA form was signed.

## 2020-10-05 ENCOUNTER — PATIENT MESSAGE (OUTPATIENT)
Dept: ADMINISTRATIVE | Facility: HOSPITAL | Age: 40
End: 2020-10-05

## 2021-01-04 ENCOUNTER — PATIENT MESSAGE (OUTPATIENT)
Dept: ADMINISTRATIVE | Facility: HOSPITAL | Age: 41
End: 2021-01-04

## 2021-04-05 ENCOUNTER — PATIENT MESSAGE (OUTPATIENT)
Dept: ADMINISTRATIVE | Facility: HOSPITAL | Age: 41
End: 2021-04-05

## 2021-04-16 ENCOUNTER — PATIENT MESSAGE (OUTPATIENT)
Dept: RESEARCH | Facility: HOSPITAL | Age: 41
End: 2021-04-16

## 2021-07-07 ENCOUNTER — PATIENT MESSAGE (OUTPATIENT)
Dept: ADMINISTRATIVE | Facility: HOSPITAL | Age: 41
End: 2021-07-07

## 2021-10-04 ENCOUNTER — PATIENT MESSAGE (OUTPATIENT)
Dept: ADMINISTRATIVE | Facility: HOSPITAL | Age: 41
End: 2021-10-04

## 2022-01-26 ENCOUNTER — PATIENT MESSAGE (OUTPATIENT)
Dept: ADMINISTRATIVE | Facility: HOSPITAL | Age: 42
End: 2022-01-26
Payer: COMMERCIAL

## 2022-05-23 ENCOUNTER — PATIENT MESSAGE (OUTPATIENT)
Dept: DERMATOLOGY | Facility: CLINIC | Age: 42
End: 2022-05-23
Payer: COMMERCIAL

## 2022-05-26 ENCOUNTER — OFFICE VISIT (OUTPATIENT)
Dept: DERMATOLOGY | Facility: CLINIC | Age: 42
End: 2022-05-26
Payer: COMMERCIAL

## 2022-05-26 DIAGNOSIS — L72.0 EIC (EPIDERMAL INCLUSION CYST): Primary | ICD-10-CM

## 2022-05-26 PROCEDURE — 99203 OFFICE O/P NEW LOW 30 MIN: CPT | Mod: S$GLB,,, | Performed by: STUDENT IN AN ORGANIZED HEALTH CARE EDUCATION/TRAINING PROGRAM

## 2022-05-26 PROCEDURE — 99999 PR PBB SHADOW E&M-EST. PATIENT-LVL III: CPT | Mod: PBBFAC,,, | Performed by: STUDENT IN AN ORGANIZED HEALTH CARE EDUCATION/TRAINING PROGRAM

## 2022-05-26 PROCEDURE — 99999 PR PBB SHADOW E&M-EST. PATIENT-LVL III: ICD-10-PCS | Mod: PBBFAC,,, | Performed by: STUDENT IN AN ORGANIZED HEALTH CARE EDUCATION/TRAINING PROGRAM

## 2022-05-26 PROCEDURE — 1160F RVW MEDS BY RX/DR IN RCRD: CPT | Mod: CPTII,S$GLB,, | Performed by: STUDENT IN AN ORGANIZED HEALTH CARE EDUCATION/TRAINING PROGRAM

## 2022-05-26 PROCEDURE — 1160F PR REVIEW ALL MEDS BY PRESCRIBER/CLIN PHARMACIST DOCUMENTED: ICD-10-PCS | Mod: CPTII,S$GLB,, | Performed by: STUDENT IN AN ORGANIZED HEALTH CARE EDUCATION/TRAINING PROGRAM

## 2022-05-26 PROCEDURE — 1159F PR MEDICATION LIST DOCUMENTED IN MEDICAL RECORD: ICD-10-PCS | Mod: CPTII,S$GLB,, | Performed by: STUDENT IN AN ORGANIZED HEALTH CARE EDUCATION/TRAINING PROGRAM

## 2022-05-26 PROCEDURE — 99203 PR OFFICE/OUTPT VISIT, NEW, LEVL III, 30-44 MIN: ICD-10-PCS | Mod: S$GLB,,, | Performed by: STUDENT IN AN ORGANIZED HEALTH CARE EDUCATION/TRAINING PROGRAM

## 2022-05-26 PROCEDURE — 1159F MED LIST DOCD IN RCRD: CPT | Mod: CPTII,S$GLB,, | Performed by: STUDENT IN AN ORGANIZED HEALTH CARE EDUCATION/TRAINING PROGRAM

## 2022-05-26 NOTE — PROGRESS NOTES
Subjective:       Patient ID:  Neftali Gamble is a 41 y.o. male who presents for   Chief Complaint   Patient presents with    Cyst     Neck     Cyst - Initial  Affected locations: neck  Duration: 2 weeks  Signs / symptoms: redness and tender  Severity: mild  Timing: constant  Aggravated by: nothing  Relieving factors/Treatments tried: nothing  Improvement on treatment: no relief        Review of Systems   Constitutional: Negative.         Objective:    Physical Exam   Constitutional: He appears well-developed and well-nourished. No distress.   Neurological: He is alert and oriented to person, place, and time. He is not disoriented.   Psychiatric: He has a normal mood and affect.   Skin:   Areas Examined (abnormalities noted in diagram):   Neck Inspection Performed              Diagram Legend     Erythematous scaling macule/papule c/w actinic keratosis       Vascular papule c/w angioma      Pigmented verrucoid papule/plaque c/w seborrheic keratosis      Yellow umbilicated papule c/w sebaceous hyperplasia      Irregularly shaped tan macule c/w lentigo     1-2 mm smooth white papules consistent with Milia      Movable subcutaneous cyst with punctum c/w epidermal inclusion cyst      Subcutaneous movable cyst c/w pilar cyst      Firm pink to brown papule c/w dermatofibroma      Pedunculated fleshy papule(s) c/w skin tag(s)      Evenly pigmented macule c/w junctional nevus     Mildly variegated pigmented, slightly irregular-bordered macule c/w mildly atypical nevus      Flesh colored to evenly pigmented papule c/w intradermal nevus       Pink pearly papule/plaque c/w basal cell carcinoma      Erythematous hyperkeratotic cursted plaque c/w SCC      Surgical scar with no sign of skin cancer recurrence      Open and closed comedones      Inflammatory papules and pustules      Verrucoid papule consistent consistent with wart     Erythematous eczematous patches and plaques     Dystrophic onycholytic nail with subungual debris  c/w onychomycosis     Umbilicated papule    Erythematous-base heme-crusted tan verrucoid plaque consistent with inflamed seborrheic keratosis     Erythematous Silvery Scaling Plaque c/w Psoriasis     See annotation      Assessment / Plan:        EIC (epidermal inclusion cyst)  Reassurance given to patient. No treatment is necessary.   Discussed treatment options - excision vs observation. Cysts may recur with excision. Will schedule pt for excision.           No follow-ups on file.

## 2022-06-23 ENCOUNTER — PROCEDURE VISIT (OUTPATIENT)
Dept: DERMATOLOGY | Facility: CLINIC | Age: 42
End: 2022-06-23
Payer: COMMERCIAL

## 2022-06-23 DIAGNOSIS — L72.0 EIC (EPIDERMAL INCLUSION CYST): Primary | ICD-10-CM

## 2022-06-23 PROCEDURE — 88304 TISSUE EXAM BY PATHOLOGIST: CPT | Performed by: PATHOLOGY

## 2022-06-23 PROCEDURE — 12042 PR LAYR CLOS WND REST BODY 2.6-7.5 CM: ICD-10-PCS | Mod: 51,S$GLB,, | Performed by: STUDENT IN AN ORGANIZED HEALTH CARE EDUCATION/TRAINING PROGRAM

## 2022-06-23 PROCEDURE — 99499 NO LOS: ICD-10-PCS | Mod: S$GLB,,, | Performed by: STUDENT IN AN ORGANIZED HEALTH CARE EDUCATION/TRAINING PROGRAM

## 2022-06-23 PROCEDURE — 11422 PR EXC SKIN BENIG 1.1-2 CM REMAINDR BODY: ICD-10-PCS | Mod: S$GLB,,, | Performed by: STUDENT IN AN ORGANIZED HEALTH CARE EDUCATION/TRAINING PROGRAM

## 2022-06-23 PROCEDURE — 88304 PR  SURG PATH,LEVEL III: ICD-10-PCS | Mod: 26,,, | Performed by: PATHOLOGY

## 2022-06-23 PROCEDURE — 88304 TISSUE EXAM BY PATHOLOGIST: CPT | Mod: 26,,, | Performed by: PATHOLOGY

## 2022-06-23 PROCEDURE — 99499 UNLISTED E&M SERVICE: CPT | Mod: S$GLB,,, | Performed by: STUDENT IN AN ORGANIZED HEALTH CARE EDUCATION/TRAINING PROGRAM

## 2022-06-23 PROCEDURE — 12042 INTMD RPR N-HF/GENIT2.6-7.5: CPT | Mod: 51,S$GLB,, | Performed by: STUDENT IN AN ORGANIZED HEALTH CARE EDUCATION/TRAINING PROGRAM

## 2022-06-23 PROCEDURE — 11422 EXC H-F-NK-SP B9+MARG 1.1-2: CPT | Mod: S$GLB,,, | Performed by: STUDENT IN AN ORGANIZED HEALTH CARE EDUCATION/TRAINING PROGRAM

## 2022-06-23 NOTE — PATIENT INSTRUCTIONS
Post- Operative Wound Care    Your doctor has performed local skin surgery today.  Vaseline ointment and a pressure bandage were placed after the surgery.  It is very important that you keep this bandage in place for 24 hours.  This will decrease the risk of post - operative infection and bleeding.  After 24 hours, you may remove the band aid and wash the area with warm soap and water and apply Vaseline ointment.  Many patients prefer to use Neosporin or Bacitracin ointment.  This is acceptable; however know that you can develop an allergy to this medication even if you have used it safely for years.  It is important to keep the area moist.  Letting it dry out and get air slows healing time, will worsen the scar, and make it more difficult to remove the stitches.  Band aid is optional after first 24 hours.    It is best NOT to use hydrogen peroxide or antibacterial soap to wash the operative site.       If you notice increasing redness, tenderness, pain, or yellow drainage at the biopsy or surgical site, please notify your doctor.  These are signs of an infection.    If your biopsy/surgical site is bleeding, apply firm pressure for 15 minutes straight.  Repeat for another 15 minutes, if it is still bleeding.   If the surgical site continues to bleed, then please contact your doctor.      For [facility] users:   You will receive your pathology results in [facility] as soon as they are available. Please be assured that your physician/provider will review your results and contact you should additional treatment be required. This is one more way [facility] is putting you first.       [facility] [place] [address]/ [phone] FAX/ [email or web address]

## 2022-06-23 NOTE — PROGRESS NOTES
PROCEDURE: Elliptical excision with intermediate layered repair in order to decrease dead space and decrease tension.    ANESTHETIC: 4 cc 1% Xylocaine with Epinephrine 1:100,000, buffered    SURGEON: Jose Alberto Gallegos MD    ASSISTANTS: Regina Marroquin MA    PREOPERATIVE DIAGNOSIS:  EIC    POSTOPERATIVE DIAGNOSIS:  Same as preoperative diagnosis    PATHOLOGIC DIAGNOSIS: Pending    LOCATION: Right neck    INITIAL LESION SIZE: 1.6 cm    EXCISED DIAMETER: 1.6 cm    PREPARATION: The diagnosis, procedure, alternatives, benefits and risks, including but not limited to: infection, bleeding/bruising, drug reactions, pain, scar or cosmetic defect, local sensation disturbances, wound dehiscence (separation of wound edges after sutures removed) and/or recurrence of present condition were explained to the patient. The patient elected to proceed.  Patient's identity was verified using 2 patient identifiers and the side and site was verified.  Time out period with surgeon, assistant and patient in surgical suite was taken.    PROCEDURE: The location noted above was prepped, draped, and anesthetized in the usual sterile fashion per Jose Alberto Gallegos MD. Lesional tissue was carefully marked with at least 0 mm margins of clinically normal skin in all directions. A fusiform elliptical excision was done with #15 blade carried down completely through the dermis into the deep subcutaneous tissues to the level of the non-muscle fascia, and dissection was carried out in that plane.  Electrocoagulation was used to obtain hemostasis. Blood loss was minimal. The wound was then approximated in a layered fashion with subcutaneous and intradermal sutures of 4.0 Monocryl, approximately 4 in number, and the wound was then superficially closed with simple interrupted sutures of 4.0 Prolene.    The patient tolerated the procedure well.    The area was cleaned and dressed appropriately and the patient was given wound care instructions, as well as an appointment for  follow-up evaluation.    LENGTH OF REPAIR: 4.2 cm

## 2022-06-30 ENCOUNTER — CLINICAL SUPPORT (OUTPATIENT)
Dept: DERMATOLOGY | Facility: CLINIC | Age: 42
End: 2022-06-30
Payer: COMMERCIAL

## 2022-06-30 DIAGNOSIS — Z48.02 ENCOUNTER FOR REMOVAL OF SUTURES: Primary | ICD-10-CM

## 2022-06-30 PROCEDURE — 99024 POSTOP FOLLOW-UP VISIT: CPT | Mod: S$GLB,,, | Performed by: STUDENT IN AN ORGANIZED HEALTH CARE EDUCATION/TRAINING PROGRAM

## 2022-06-30 PROCEDURE — 99024 PR POST-OP FOLLOW-UP VISIT: ICD-10-PCS | Mod: S$GLB,,, | Performed by: STUDENT IN AN ORGANIZED HEALTH CARE EDUCATION/TRAINING PROGRAM

## 2022-06-30 NOTE — PROGRESS NOTES
Patient presents for suture removal.  Pathology still in process.  The wound is well healed without signs of infection.  The sutures are removed.  Wound care and activity instructions given.  Return prn.

## 2022-07-05 LAB
FINAL PATHOLOGIC DIAGNOSIS: NORMAL
Lab: NORMAL